# Patient Record
Sex: FEMALE | Race: WHITE | URBAN - METROPOLITAN AREA
[De-identification: names, ages, dates, MRNs, and addresses within clinical notes are randomized per-mention and may not be internally consistent; named-entity substitution may affect disease eponyms.]

---

## 2020-06-19 ENCOUNTER — VIRTUAL VISIT (OUTPATIENT)
Dept: FAMILY MEDICINE CLINIC | Age: 25
End: 2020-06-19

## 2020-06-19 DIAGNOSIS — Z76.89 ENCOUNTER TO ESTABLISH CARE: ICD-10-CM

## 2020-06-19 DIAGNOSIS — I73.00 RAYNAUD'S PHENOMENON WITHOUT GANGRENE: ICD-10-CM

## 2020-06-19 DIAGNOSIS — Z83.49 FAMILY HISTORY OF THYROID DISEASE: ICD-10-CM

## 2020-06-19 DIAGNOSIS — M25.50 MULTIPLE JOINT PAIN: ICD-10-CM

## 2020-06-19 DIAGNOSIS — Z86.2 HISTORY OF ITP: Primary | ICD-10-CM

## 2020-06-19 DIAGNOSIS — Z13.220 SCREENING FOR HYPERLIPIDEMIA: ICD-10-CM

## 2020-06-19 NOTE — PROGRESS NOTES
Chief Complaint   Patient presents with   [facility] Establish Care     1. Have you been to the ER, urgent care clinic since your last visit? Hospitalized since your last visit? No    2. Have you seen or consulted any other health care providers outside of the [address] [place] [address] since your last visit? Include any pap smears or colon screening.  No

## 2020-06-19 NOTE — PROGRESS NOTES
Zabrina Meehan  26 y.o. female  1995  [address]  [address] [facility]       Encounter Date: 6/19/2020           New  Patient Visit Note: Mario Mejia MD    Previous PCP: Was seeing Dr. Petar Douglas in [place]    Reason for Appointment:  Chief Complaint   Patient presents with    [facility] [place] [facility]       History of Present Illness:  History provided by patient    Zabrina Meehan is a 26 y.o. female who presents today for:      ITP  Duration: diagnosed Fall, 2018  Last CBC: 6 months ago  She reports that she was bruising a lot and found that her platelets were around 60K. She went to a hematologist and started prednisone. Hematology: followed by hematologist in [place]  Current Symptoms: denies any current heavy bleeding; does endorse bruising easily    Joint Pain  Sinc February she has had joint pain in hands and feet. She also reports having back pain that is sharp. She reports that it is so bad in the mornings that she has to exercise to get rid of it. She reports that her mom is currently going through thyroid workup, and will be seing endocrinologist soon. She reports that she has Raynaud's symptoms almost every day. Review of Systems  Denies CP or dyspnea. All other ROS were reviewed and are negative except as discussed in HPI      Allergies: Patient has no known allergies. Medications: (Updated to reflect final medication list after visit)    Current Outpatient Medications:     levonorgestrel (ABBE IU), by IntraUTERine route., Disp: , Rfl:     History  Patient Care Team:  Shorty Conrad MD as PCP - General (Family Practice)    Past Medical History: she has a past medical history of History of ITP. Past Surgical History: she has no past surgical history on file. Family Medical History: family history includes Thyroid Disease in her mother. Social History: she reports that she has never smoked.  She has never used smokeless tobacco. She reports previous alcohol use. She reports that she does not use drugs. No specialty comments available. Loves to play tennis. Has been doing a lot of walks. Has recently taken up cooking. Objective:   Vital Signs  Unable to obtain vital signs today as patient does not have equipment for this at home    Physical Exam  Constitutional:       Appearance: Normal appearance. She is well-groomed and normal weight. Eyes:      General: Lids are normal. Vision grossly intact. Gaze aligned appropriately. Conjunctiva/sclera:      Right eye: Right conjunctiva is not injected. Left eye: Left conjunctiva is not injected. Neck:      Musculoskeletal: Full passive range of motion without pain and normal range of motion. Pulmonary:      Effort: Pulmonary effort is normal. No tachypnea, bradypnea, accessory muscle usage or respiratory distress. Skin:     Coloration: Skin is not ashen, cyanotic, jaundiced or mottled. Neurological:      General: No focal deficit present. Mental Status: She is alert. Mental status is at baseline. Psychiatric:         Attention and Perception: Attention and perception normal.         Mood and Affect: Mood and affect normal.         Speech: Speech normal.         Behavior: Behavior normal. Behavior is cooperative. Assessment & Plan:      ICD-10-CM ICD-9-CM    1. History of ITP Z86.2 V12.3 REFERRAL TO RHEUMATOLOGY      CBC WITH AUTOMATED DIFF   2. Raynaud's phenomenon without gangrene I73.00 443.0 REFERRAL TO RHEUMATOLOGY      UA WITH REFLEX MICRO AND CULTURE      RANDAL BY MULTIPLEX FLOW IA, QL      COMPLEMENT, C3      COMPLEMENT, C4      RHEUMATOID FACTOR, QL      SED RATE (ESR)      CRP, HIGH SENSITIVITY   3. Family history of thyroid disease Z83.49 V18.19 TSH REFLEX TO T4   4. Encounter to establish care Z76.89 V65.8    5.  Multiple joint pain M25.50 719.49 REFERRAL TO RHEUMATOLOGY   6. Screening for hyperlipidemia Z13.220 V77.91 LIPID PANEL METABOLIC PANEL, COMPREHENSIVE     Joint Pain and self-report of Raynaud's symptoms in setting of personal history of ITP and family history of thyroid disease: will check labs and provide referral to rheumatology    I was in the office while conducting this encounter. Consent:  She and/or her healthcare decision maker is aware that this patient-initiated Telehealth encounter is a billable service, with coverage as determined by her insurance carrier. She is aware that she may receive a bill and has provided verbal consent to proceed: Yes    This virtual visit was conducted via [employer] [place]. Pursuant to the emergency declaration under the [facility] waiver authority and the [employer] Act, this Virtual  Visit was conducted to reduce the patient's risk of exposure to COVID-19 and provide continuity of care for an established patient. Services were provided through a video synchronous discussion virtually to substitute for in-person clinic visit. Due to this being a TeleHealth evaluation, many elements of the physical examination are unable to be assessed. Total Time: minutes: >30. I have discussed the diagnosis with the patient and the intended plan as seen in the above orders. The patient has received an after-visit summary along with patient information handout. I have discussed medication side effects and warnings with the patient as well. Disposition  Follow-up and Dispositions    · Return in about 3 months (around 9/19/2020).            Jackson Sandoval MD

## 2020-07-07 LAB
ALBUMIN SERPL-MCNC: 4.5 G/DL (ref 3.9–5)
ALBUMIN/GLOB SERPL: 1.8 {RATIO} (ref 1.2–2.2)
ALP SERPL-CCNC: 48 IU/L (ref 39–117)
ALT SERPL-CCNC: 17 IU/L (ref 0–32)
ANA SER QL: NEGATIVE
APPEARANCE UR: CLEAR
AST SERPL-CCNC: 18 IU/L (ref 0–40)
BACTERIA #/AREA URNS HPF: NORMAL /[HPF]
BASOPHILS # BLD AUTO: 0.1 X10E3/UL (ref 0–0.2)
BASOPHILS NFR BLD AUTO: 1 %
BILIRUB SERPL-MCNC: 0.5 MG/DL (ref 0–1.2)
BILIRUB UR QL STRIP: NEGATIVE
BUN SERPL-MCNC: 11 MG/DL (ref 6–20)
BUN/CREAT SERPL: 16 (ref 9–23)
C3 SERPL-MCNC: 104 MG/DL (ref 82–167)
C4 SERPL-MCNC: 21 MG/DL (ref 14–44)
CALCIUM SERPL-MCNC: 9.1 MG/DL (ref 8.7–10.2)
CASTS URNS QL MICRO: NORMAL /LPF
CHLORIDE SERPL-SCNC: 103 MMOL/L (ref 96–106)
CHOLEST SERPL-MCNC: 208 MG/DL (ref 100–199)
CO2 SERPL-SCNC: 25 MMOL/L (ref 20–29)
COLOR UR: YELLOW
CREAT SERPL-MCNC: 0.68 MG/DL (ref 0.57–1)
CRP SERPL HS-MCNC: 0.69 MG/L (ref 0–3)
EOSINOPHIL # BLD AUTO: 0.1 X10E3/UL (ref 0–0.4)
EOSINOPHIL NFR BLD AUTO: 2 %
EPI CELLS #/AREA URNS HPF: NORMAL /HPF (ref 0–10)
ERYTHROCYTE [DISTWIDTH] IN BLOOD BY AUTOMATED COUNT: 12.9 % (ref 11.7–15.4)
ERYTHROCYTE [SEDIMENTATION RATE] IN BLOOD BY WESTERGREN METHOD: 6 MM/HR (ref 0–32)
GLOBULIN SER CALC-MCNC: 2.5 G/DL (ref 1.5–4.5)
GLUCOSE SERPL-MCNC: 92 MG/DL (ref 65–99)
GLUCOSE UR QL: NEGATIVE
HCT VFR BLD AUTO: 40.9 % (ref 34–46.6)
HDLC SERPL-MCNC: 77 MG/DL
HGB BLD-MCNC: 13.2 G/DL (ref 11.1–15.9)
HGB UR QL STRIP: ABNORMAL
IMM GRANULOCYTES # BLD AUTO: 0 X10E3/UL (ref 0–0.1)
IMM GRANULOCYTES NFR BLD AUTO: 0 %
INTERPRETATION, 910389: NORMAL
KETONES UR QL STRIP: NEGATIVE
LDLC SERPL CALC-MCNC: 113 MG/DL (ref 0–99)
LEUKOCYTE ESTERASE UR QL STRIP: NEGATIVE
LYMPHOCYTES # BLD AUTO: 1.3 X10E3/UL (ref 0.7–3.1)
LYMPHOCYTES NFR BLD AUTO: 30 %
MCH RBC QN AUTO: 26.5 PG (ref 26.6–33)
MCHC RBC AUTO-ENTMCNC: 32.3 G/DL (ref 31.5–35.7)
MCV RBC AUTO: 82 FL (ref 79–97)
MICRO URNS: ABNORMAL
MONOCYTES # BLD AUTO: 0.3 X10E3/UL (ref 0.1–0.9)
MONOCYTES NFR BLD AUTO: 8 %
MUCOUS THREADS URNS QL MICRO: PRESENT
NEUTROPHILS # BLD AUTO: 2.6 X10E3/UL (ref 1.4–7)
NEUTROPHILS NFR BLD AUTO: 59 %
NITRITE UR QL STRIP: NEGATIVE
PH UR STRIP: 5.5 [PH] (ref 5–7.5)
PLATELET # BLD AUTO: 133 X10E3/UL (ref 150–450)
POTASSIUM SERPL-SCNC: 4.8 MMOL/L (ref 3.5–5.2)
PROT SERPL-MCNC: 7 G/DL (ref 6–8.5)
PROT UR QL STRIP: NEGATIVE
RBC # BLD AUTO: 4.98 X10E6/UL (ref 3.77–5.28)
RBC #/AREA URNS HPF: NORMAL /HPF (ref 0–2)
RHEUMATOID FACT SERPL-ACNC: <10 IU/ML (ref 0–13.9)
SODIUM SERPL-SCNC: 141 MMOL/L (ref 134–144)
SP GR UR: 1.02 (ref 1–1.03)
TRIGL SERPL-MCNC: 90 MG/DL (ref 0–149)
TSH SERPL DL<=0.005 MIU/L-ACNC: 0.72 UIU/ML (ref 0.45–4.5)
URINALYSIS REFLEX , 377201: ABNORMAL
UROBILINOGEN UR STRIP-MCNC: 0.2 MG/DL (ref 0.2–1)
VLDLC SERPL CALC-MCNC: 18 MG/DL (ref 5–40)
WBC # BLD AUTO: 4.4 X10E3/UL (ref 3.4–10.8)
WBC #/AREA URNS HPF: NORMAL /HPF (ref 0–5)